# Patient Record
Sex: FEMALE | Race: WHITE
[De-identification: names, ages, dates, MRNs, and addresses within clinical notes are randomized per-mention and may not be internally consistent; named-entity substitution may affect disease eponyms.]

---

## 2017-01-31 NOTE — EDDOCDS
Nurse's Notes                                                                                     

[facility]                                                                         

Name: Etta Collisn                                                                               

Age: 21 yrs                                                                                       

Sex: Female                                                                                       

: 1995                                                                                   

MRN: [id number]                                                                                     

Arrival Date: 2017                                                                          

Time: 02:14                                                                                       

Account#: [id number]                                                                              

Bed TR7                                                                                           

Private MD:                                                                                       

Diagnosis: Cellulitis of buttock-RIGHT                                                            

                                                                                                  

Presentation:                                                                                     

                                                                                             

02:20 Presenting complaint: Patient states: she has a boil above her buttocks been there for  cz  

      a couple of days and is hurting worse. pt is concerned that she my have MRSA because        

      she says her roommate has it. Adult Sepsis Screening: The patient does not have new or      

      worsening altered mentation. Patient's respiratory rate is less than 22. Systolic blood     

      pressure is greater than 100. Patient has a qSOFA score of 0- Negative Sepsis Screen.       

      Suicide/Homicide risk assessment- the patient denies having any suicidal and/or             

      homicidal ideations and does not present with any other emotional, behavioral or mental     

      health complaints.  Status: Patient is not a  or              

      dependent. Transition of care: patient was not received from another setting of care.       

02:20 Acuity: CHELLE Level 4                                                                     cz  

02:20 Method Of Arrival: Walkin/Carried/Asstd                                                 cz  

                                                                                                  

Triage Assessment:                                                                                

02:22 General: Appears in no apparent distress. Pain: Location: buttocks Pain currently is 3  cz  

      out of 10 on a pain scale. Pt Declines HIV testing.                                         

                                                                                                  

OB/GYN:                                                                                           

02:22 LMP 2017                                                                           cz  

                                                                                                  

Historical:                                                                                       

- Allergies: No known drug Allergies;                                                             

- Home Meds:                                                                                      

1. birth control                                                                                

- PMHx: none;                                                                                     

- Social history: Smoking status: Patient states was never smoker of tobacco. No                  

barriers to communication noted, The patient speaks fluent English, Speaks                      

appropriately for age.                                                                          

- Family history: Not pertinent.                                                                  

- : The pt / caregiver states he / she is not on anticoagulants. Home medication list             

is obtained from the patient.                                                                   

- Exposure Risk Screening:: None identified.                                                      

                                                                                                  

                                                                                                  

Screenin:43 Screening information is obtained from the patient. Fall risk: No risks identified.     cz  

      Assistance ADL's: requires no assistance with activities of daily living. Abuse/DV          

      Screen: The patient / caregiver reports he/she is: not in a situation that causes fear,     

      pain or injury. Nutritional screening: No deficits noted. Advance Directives:               

      Currently, there is no health care proxy. There is no active DNR order. There is no         

      living will. There is no Power of . Advance directive information has not           

      previously been placed in an [facility] medical record. Further advance directive information      

      is declined. home support is adequate.                                                      

                                                                                                  

Assessment:                                                                                       

02:43 General: Appears in no apparent distress. Pain: Location: buttocks.                     cz  

                                                                                                  

Vital Signs:                                                                                      

02:22  / 80; Pulse 117; Resp 16; Temp 96.4(T); Pulse Ox 98% on R/A; Weight 62.6 kg;     cz  

      Height 5 ft. 9 in. (175.26 cm);                                                             

02:38 Pulse 104;                                                                              kb5 

02:22 Body Mass Index 20.38 (62.60 kg, 175.26 cm)                                             cz  

                                                                                                  

Vitals:                                                                                           

02:22 Log In Time: 2017 at 02:14.                                                   

                                                                                                  

ED Course:                                                                                        

02:15 Patient visited by Dae Hagan, Reg.                                                 pm4 

02:15 Patient moved to Waiting                                                                pm4 

02:22 Triage Initiated                                                                        cz  

02:25 Patient moved to I4 / M4                                                                cz  

02:35 [facility]                                                   ck7 

02:35 Bridger Hernandez DO is Attending Physician.                                            ck7 

02:35 Patient visited by Rk Cho RPA-C.                                        ck7 

02:38 Patient visited by Bancroft, Kristopher, PCA.                                           kb5 

02:43 The patient / caregiver is instructed regarding the plan of care and ED course.         cz  

02:43 No IV's were initiated during this patient's visit. No procedures done that require     cz  

      assistance.                                                                                 

02:45 Patient moved to 7                                                                    [facility] 

                                                                                                  

Administered Medications:                                                                         

02:43 Drug: Trimethoprim-Sulfamethoxazole 1 tabs [sulfamethoxazole 800 mg-trimethoprim 160 mg cz  

      tablet (1 tabs)] Route: PO;                                                                 

                                                                                                  

                                                                                                  

Order Results:                                                                                    

There are currently no results for this order.                                                    

Outcome:                                                                                          

02:36 Discharge ordered by Provider.                                                          ck7 

02:43 Discharge Assessment: Patient awake, alert and oriented x 3. No cognitive and/or        cz  

      functional deficits noted. Patient verbalized understanding of disposition                  

      instructions. patient administered narcotics - no. The following High Risk Discharge        

      criteria are identified: None. Discharged to home ambulatory, with parent. Condition:       

      stable. Discharge instructions given to patient, Instructed on discharge instructions,      

      follow up and referral plans. medication usage, Demonstrated understanding of               

      instructions, medications, Pt was receptive of discharge instructions/ teaching.            

      Prescriptions given X 1. No special radiology studies were completed. Property              

      :Personal belongings accompany Pt.                                                          

02:45 Patient left the ED.                                                                    jeni  

                                                                                                  

Signatures:                                                                                       

Marciano Carbajal, RN                      RN   cz Bancroft, Kristopher, DENISE               PCA  kb5                                                  

Rk Cho, RPA-C            RPA-Cck7                                                  

Neno Syed,RN                        RN   Dae Charles, Reg                     Reg  pm4                                                  

                                                                                                  

**************************************************************************************************

MTDD

## 2017-01-31 NOTE — EDDOCDS
Physician Documentation                                                                           

[facility]                                                                         

Name: Etta Collins                                                                               

Age: 21 yrs                                                                                       

Sex: Female                                                                                       

: 1995                                                                                   

MRN: [id number]                                                                                     

Arrival Date: 2017                                                                          

Time: 02:14                                                                                       

Account#: [id number]                                                                              

Bed TR7                                                                                           

Private MD:                                                                                       

Disposition:                                                                                      

17 02:36 Discharged to Home/Self Care. Impression: Cellulitis of buttock - RIGHT.           

- Condition is Stable.                                                                            

- Discharge Instructions: Cellulitis.                                                             

- Prescriptions for Bactrim - 160 mg Oral Tablet - take 1 tablet by ORAL route              

every 12 hours for 10 days; 20 tablet.                                                          

- Medication Reconciliation, Local Pharmacy Hours form.                                           

- Follow up: Emergency Department; When: 1 - 2 days; Reason: Wound/Symptom Recheck,               

Recheck today's complaints, Continuance of care.                                                

- Problem is new.                                                                                 

- Symptoms have improved.                                                                         

- Notes: USE WARM COMPRESSES OR WARM SHOWERS 2-3 TIMES PER DAY, USE ANTIBIOTIC AS                 

INSTRUTCED, FOLLOW UP WITH YOUR [facility] OR RETURN TO THE ER IN 48 HOURS            

FOR RECHECK                                                                                     

                                                                                                  

                                                                                                  

Historical:                                                                                       

- Allergies: No known drug Allergies;                                                             

- Home Meds:                                                                                      

1. birth control                                                                                

- PMHx: none;                                                                                     

- Social history: Smoking status: Patient states was never smoker of tobacco. No                  

barriers to communication noted, The patient speaks fluent English, Speaks                      

appropriately for age.                                                                          

- Family history: Not pertinent.                                                                  

- : The pt / caregiver states he / she is not on anticoagulants. Home medication list             

is obtained from the patient.                                                                   

- Exposure Risk Screening:: None identified.                                                      

                                                                                                  

                                                                                                  

OB/GYN:                                                                                           

                                                                                             

02:22 LMP 2017                                                                           cz  

                                                                                                  

Vital Signs:                                                                                      

02:22  / 80; Pulse 117; Resp 16; Temp 96.4(T); Pulse Ox 98% on R/A; Weight 62.6 kg /    cz  

      138.01 lbs; Height 5 ft. 9 in. (175.26 cm);                                                 

02:38 Pulse 104;                                                                              kb5 

02:22 Body Mass Index 20.38 (62.60 kg, 175.26 cm)                                             cz  

                                                                                                  

MDM:                                                                                              

02:35 Trimethoprim-Sulfamethoxazole 160 mg-800 mg (DS) 1 tabs PO once ordered.                ck7 

                                                                                                  

Administered Medications:                                                                         

02:43 Drug: Trimethoprim-Sulfamethoxazole 1 tabs [sulfamethoxazole 800 mg-trimethoprim 160 mg cz  

      tablet (1 tabs)] Route: PO;                                                                 

                                                                                                  

                                                                                                  

Signatures:                                                                                       

Marciano Carbajal, RN                      RN   cz                                                   

Rk Cho, RPA-C            RPA-Cck7                                                  

                                                                                                  

**************************************************************************************************

MTDD

## 2017-02-02 NOTE — EDDOCDS
Physician Documentation                                                                           

[facility]                                                                         

Name: Etta Collins                                                                               

Age: 21 yrs                                                                                       

Sex: Female                                                                                       

: 1995                                                                                   

MRN: [id number]                                                                                     

Arrival Date: 2017                                                                          

Time: 02:14                                                                                       

Account#: [id number]                                                                              

Bed TR7                                                                                           

Private MD:                                                                                       

Disposition:                                                                                      

17 02:36 Discharged to Home/Self Care. Impression: Cellulitis of buttock - RIGHT.           

- Condition is Stable.                                                                            

- Discharge Instructions: Cellulitis.                                                             

- Prescriptions for Bactrim - 160 mg Oral Tablet - take 1 tablet by ORAL route              

every 12 hours for 10 days; 20 tablet.                                                          

- Medication Reconciliation, Local Pharmacy Hours form.                                           

- Follow up: Emergency Department; When: 1 - 2 days; Reason: Wound/Symptom Recheck,               

Recheck today's complaints, Continuance of care.                                                

- Problem is new.                                                                                 

- Symptoms have improved.                                                                         

- Notes: USE WARM COMPRESSES OR WARM SHOWERS 2-3 TIMES PER DAY, USE ANTIBIOTIC AS                 

INSTRUTCED, FOLLOW UP WITH YOUR SCHOOL [facility] OR RETURN TO THE ER IN 48 HOURS            

FOR RECHECK                                                                                     

                                                                                                  

                                                                                                  

Historical:                                                                                       

- Allergies: No known drug Allergies;                                                             

- Home Meds:                                                                                      

1. birth control                                                                                

- PMHx: none;                                                                                     

- Social history: Smoking status: Patient states was never smoker of tobacco. No                  

barriers to communication noted, The patient speaks fluent English, Speaks                      

appropriately for age.                                                                          

- Family history: Not pertinent.                                                                  

- : The pt / caregiver states he / she is not on anticoagulants. Home medication list             

is obtained from the patient.                                                                   

- Exposure Risk Screening:: None identified.                                                      

                                                                                                  

                                                                                                  

OB/GYN:                                                                                           

                                                                                             

02:22 LMP 2017                                                                           cz  

                                                                                                  

Vital Signs:                                                                                      

02:22  / 80; Pulse 117; Resp 16; Temp 96.4(T); Pulse Ox 98% on R/A; Weight 62.6 kg /    cz  

      138.01 lbs; Height 5 ft. 9 in. (175.26 cm);                                                 

02:38 Pulse 104;                                                                              kb5 

02:22 Body Mass Index 20.38 (62.60 kg, 175.26 cm)                                             cz  

                                                                                                  

MDM:                                                                                              

02:35 Trimethoprim-Sulfamethoxazole 160 mg-800 mg (DS) 1 tabs PO once ordered.                ck7 

03:17 Financial registration complete.                                                        hs2 

03:20 [facility] Payment Agreement was scanned into [employer] and attached to record.               hs2 

12:25 T-Sheet-- Draft Copy was scanned into [employer] and attached to record.                   gb  

                                                                                                  

Administered Medications:                                                                         

02:43 Drug: Trimethoprim-Sulfamethoxazole 1 tabs [sulfamethoxazole 800 mg-trimethoprim 160 mg cz  

      tablet (1 tabs)] Route: PO;                                                                 

                                                                                                  

                                                                                                  

Signatures:                                                                                       

Marciano Carbajal RN                      RN   cz                                                   

Nichole Hu, Reg                  Reg  gb                                                   

Rk Cho, RPA-C            RPA-Cck7                                                  

Mary Barbour, Reg                   Reg  hs2                                                  

                                                                                                  

The chart was reviewed and I authenticate all verbal orders and agree with the evaluation and 
treatment provided.Attachments:

03:20 [facility] Payment Agreement                                                                hs2 

12:25 T-Sheet-- Draft Copy                                                                    gb  

                                                                                                  

**************************************************************************************************



*** Chart Complete ***
MTDD

## 2017-02-02 NOTE — EDDOCDS
Physician Documentation                                                                           

[facility]                                                                         

Name: Etta Collins                                                                               

Age: 21 yrs                                                                                       

Sex: Female                                                                                       

: 1995                                                                                   

MRN: [id number]                                                                                     

Arrival Date: 2017                                                                          

Time: 02:14                                                                                       

Account#: [id number]                                                                              

Bed TR7                                                                                           

Private MD:                                                                                       

Disposition:                                                                                      

17 02:36 Discharged to Home/Self Care. Impression: Cellulitis of buttock - RIGHT.           

- Condition is Stable.                                                                            

- Discharge Instructions: Cellulitis.                                                             

- Prescriptions for Bactrim - 160 mg Oral Tablet - take 1 tablet by ORAL route              

every 12 hours for 10 days; 20 tablet.                                                          

- Medication Reconciliation, Local Pharmacy Hours form.                                           

- Follow up: Emergency Department; When: 1 - 2 days; Reason: Wound/Symptom Recheck,               

Recheck today's complaints, Continuance of care.                                                

- Problem is new.                                                                                 

- Symptoms have improved.                                                                         

- Notes: USE WARM COMPRESSES OR WARM SHOWERS 2-3 TIMES PER DAY, USE ANTIBIOTIC AS                 

INSTRUTCED, FOLLOW UP WITH YOUR SCHOOL [facility] OR RETURN TO THE ER IN 48 HOURS            

FOR RECHECK                                                                                     

                                                                                                  

                                                                                                  

Historical:                                                                                       

- Allergies: No known drug Allergies;                                                             

- Home Meds:                                                                                      

1. birth control                                                                                

- PMHx: none;                                                                                     

- Social history: Smoking status: Patient states was never smoker of tobacco. No                  

barriers to communication noted, The patient speaks fluent English, Speaks                      

appropriately for age.                                                                          

- Family history: Not pertinent.                                                                  

- : The pt / caregiver states he / she is not on anticoagulants. Home medication list             

is obtained from the patient.                                                                   

- Exposure Risk Screening:: None identified.                                                      

                                                                                                  

                                                                                                  

OB/GYN:                                                                                           

                                                                                             

02:22 LMP 2017                                                                           cz  

                                                                                                  

Vital Signs:                                                                                      

02:22  / 80; Pulse 117; Resp 16; Temp 96.4(T); Pulse Ox 98% on R/A; Weight 62.6 kg /    cz  

      138.01 lbs; Height 5 ft. 9 in. (175.26 cm);                                                 

02:38 Pulse 104;                                                                              kb5 

02:22 Body Mass Index 20.38 (62.60 kg, 175.26 cm)                                             cz  

                                                                                                  

MDM:                                                                                              

02:35 Trimethoprim-Sulfamethoxazole 160 mg-800 mg (DS) 1 tabs PO once ordered.                ck7 

03:17 Financial registration complete.                                                        hs2 

03:20 [facility] Payment Agreement was scanned into [email or web address] and attached to record.               hs2 

12:25 T-Sheet-- Draft Copy was scanned into [email or web address] and attached to record.                   gb  

                                                                                                  

Administered Medications:                                                                         

02:43 Drug: Trimethoprim-Sulfamethoxazole 1 tabs [sulfamethoxazole 800 mg-trimethoprim 160 mg cz  

      tablet (1 tabs)] Route: PO;                                                                 

                                                                                                  

                                                                                                  

Signatures:                                                                                       

Marciano Carbajal RN                      RN   cz                                                   

Nichole Hu, Reg                  Reg  gb                                                   

Rk Cho, RPA-C            RPA-Cck7                                                  

Mary Barbour, Reg                   Reg  hs2                                                  

                                                                                                  

The chart was reviewed and I authenticate all verbal orders and agree with the evaluation and 
treatment provided.Attachments:

03:20 [facility] Payment Agreement                                                                hs2 

12:25 T-Sheet-- Draft Copy                                                                    gb  

                                                                                                  

**************************************************************************************************



*** Chart Complete ***
MTDD

## 2017-10-06 ENCOUNTER — HOSPITAL ENCOUNTER (OUTPATIENT)
Dept: HOSPITAL 53 - M LAB REF | Age: 22
End: 2017-10-06
Attending: PHYSICIAN ASSISTANT
Payer: COMMERCIAL

## 2017-10-06 DIAGNOSIS — A60.04: Primary | ICD-10-CM

## 2019-03-14 ENCOUNTER — HOSPITAL ENCOUNTER (OUTPATIENT)
Dept: HOSPITAL 53 - M SFHCLERA | Age: 24
End: 2019-03-14
Attending: NURSE PRACTITIONER
Payer: COMMERCIAL

## 2019-03-14 DIAGNOSIS — J02.9: Primary | ICD-10-CM
